# Patient Record
Sex: MALE | Race: WHITE | Employment: UNEMPLOYED | URBAN - NONMETROPOLITAN AREA
[De-identification: names, ages, dates, MRNs, and addresses within clinical notes are randomized per-mention and may not be internally consistent; named-entity substitution may affect disease eponyms.]

---

## 2018-07-13 ENCOUNTER — HOSPITAL ENCOUNTER (EMERGENCY)
Age: 28
Discharge: HOME | End: 2018-07-14
Attending: EMERGENCY MEDICINE
Payer: MEDICARE

## 2018-07-13 DIAGNOSIS — R45.851 SUICIDAL IDEATION: Primary | ICD-10-CM

## 2018-07-13 DIAGNOSIS — F19.20 DRUG ADDICTION (HCC): ICD-10-CM

## 2018-07-13 LAB
ABSOLUTE EOS #: 0.1 K/UL (ref 0–0.4)
ABSOLUTE IMMATURE GRANULOCYTE: ABNORMAL K/UL (ref 0–0.3)
ABSOLUTE LYMPH #: 1.8 K/UL (ref 1–4.8)
ABSOLUTE MONO #: 0.5 K/UL (ref 0.1–1.2)
ACETAMINOPHEN LEVEL: <10 UG/ML (ref 10–30)
AMPHETAMINE SCREEN URINE: NEGATIVE
ANION GAP SERPL CALCULATED.3IONS-SCNC: 11 MMOL/L (ref 9–17)
BARBITURATE SCREEN URINE: NEGATIVE
BASOPHILS # BLD: 1 % (ref 0–2)
BASOPHILS ABSOLUTE: 0.1 K/UL (ref 0–0.2)
BENZODIAZEPINE SCREEN, URINE: NEGATIVE
BUN BLDV-MCNC: 13 MG/DL (ref 6–20)
BUN/CREAT BLD: 20 (ref 9–20)
BUPRENORPHINE URINE: NEGATIVE
CALCIUM SERPL-MCNC: 9.3 MG/DL (ref 8.6–10.4)
CANNABINOID SCREEN URINE: POSITIVE
CHLORIDE BLD-SCNC: 99 MMOL/L (ref 98–107)
CO2: 30 MMOL/L (ref 20–31)
COCAINE METABOLITE, URINE: NEGATIVE
CREAT SERPL-MCNC: 0.64 MG/DL (ref 0.7–1.2)
DIFFERENTIAL TYPE: ABNORMAL
EOSINOPHILS RELATIVE PERCENT: 2 % (ref 1–8)
ETHANOL PERCENT: NORMAL %
ETHANOL: <10 MG/DL
GFR AFRICAN AMERICAN: >60 ML/MIN
GFR NON-AFRICAN AMERICAN: >60 ML/MIN
GFR SERPL CREATININE-BSD FRML MDRD: ABNORMAL ML/MIN/{1.73_M2}
GFR SERPL CREATININE-BSD FRML MDRD: ABNORMAL ML/MIN/{1.73_M2}
GLUCOSE BLD-MCNC: 106 MG/DL (ref 70–99)
HCT VFR BLD CALC: 45.2 % (ref 41–53)
HEMOGLOBIN: 15.3 G/DL (ref 13.5–17.5)
IMMATURE GRANULOCYTES: ABNORMAL %
LYMPHOCYTES # BLD: 18 % (ref 15–43)
MCH RBC QN AUTO: 30.7 PG (ref 26–34)
MCHC RBC AUTO-ENTMCNC: 33.9 G/DL (ref 31–37)
MCV RBC AUTO: 90.4 FL (ref 80–100)
MDMA URINE: ABNORMAL
METHADONE SCREEN, URINE: NEGATIVE
METHAMPHETAMINE, URINE: NEGATIVE
MONOCYTES # BLD: 5 % (ref 6–14)
NRBC AUTOMATED: ABNORMAL PER 100 WBC
OPIATES, URINE: NEGATIVE
OXYCODONE SCREEN URINE: NEGATIVE
PDW BLD-RTO: 12.8 % (ref 11–14.5)
PHENCYCLIDINE, URINE: NEGATIVE
PLATELET # BLD: 287 K/UL (ref 140–450)
PLATELET ESTIMATE: ABNORMAL
PMV BLD AUTO: 7.1 FL (ref 6–12)
POTASSIUM SERPL-SCNC: 4.1 MMOL/L (ref 3.7–5.3)
PROPOXYPHENE, URINE: NEGATIVE
RBC # BLD: 5 M/UL (ref 4.5–5.9)
RBC # BLD: ABNORMAL 10*6/UL
SALICYLATE LEVEL: <1 MG/DL (ref 3–10)
SEG NEUTROPHILS: 74 % (ref 44–74)
SEGMENTED NEUTROPHILS ABSOLUTE COUNT: 7.1 K/UL (ref 1.8–7.7)
SODIUM BLD-SCNC: 140 MMOL/L (ref 135–144)
TEST INFORMATION: ABNORMAL
TRICYCLIC ANTIDEPRESSANTS, UR: NEGATIVE
WBC # BLD: 9.6 K/UL (ref 3.5–11)
WBC # BLD: ABNORMAL 10*3/UL

## 2018-07-13 PROCEDURE — 6370000000 HC RX 637 (ALT 250 FOR IP)

## 2018-07-13 PROCEDURE — 99285 EMERGENCY DEPT VISIT HI MDM: CPT

## 2018-07-13 PROCEDURE — 80048 BASIC METABOLIC PNL TOTAL CA: CPT

## 2018-07-13 PROCEDURE — 80306 DRUG TEST PRSMV INSTRMNT: CPT

## 2018-07-13 PROCEDURE — 85025 COMPLETE CBC W/AUTO DIFF WBC: CPT

## 2018-07-13 PROCEDURE — 6370000000 HC RX 637 (ALT 250 FOR IP): Performed by: EMERGENCY MEDICINE

## 2018-07-13 PROCEDURE — 80307 DRUG TEST PRSMV CHEM ANLYZR: CPT

## 2018-07-13 PROCEDURE — 36415 COLL VENOUS BLD VENIPUNCTURE: CPT

## 2018-07-13 PROCEDURE — G0480 DRUG TEST DEF 1-7 CLASSES: HCPCS

## 2018-07-13 RX ORDER — NICOTINE 21 MG/24HR
1 PATCH, TRANSDERMAL 24 HOURS TRANSDERMAL DAILY
Status: DISCONTINUED | OUTPATIENT
Start: 2018-07-13 | End: 2018-07-14 | Stop reason: HOSPADM

## 2018-07-13 RX ORDER — NICOTINE 21 MG/24HR
PATCH, TRANSDERMAL 24 HOURS TRANSDERMAL
Status: DISCONTINUED
Start: 2018-07-13 | End: 2018-07-14 | Stop reason: HOSPADM

## 2018-07-13 RX ORDER — LORAZEPAM 0.5 MG/1
1 TABLET ORAL ONCE
Status: COMPLETED | OUTPATIENT
Start: 2018-07-13 | End: 2018-07-13

## 2018-07-13 RX ADMIN — LORAZEPAM 1 MG: 0.5 TABLET ORAL at 18:46

## 2018-07-13 ASSESSMENT — ENCOUNTER SYMPTOMS
EYES NEGATIVE: 1
RESPIRATORY NEGATIVE: 1
GASTROINTESTINAL NEGATIVE: 1

## 2018-07-14 VITALS
TEMPERATURE: 98.2 F | BODY MASS INDEX: 21.47 KG/M2 | WEIGHT: 150 LBS | OXYGEN SATURATION: 100 % | SYSTOLIC BLOOD PRESSURE: 122 MMHG | HEIGHT: 70 IN | RESPIRATION RATE: 14 BRPM | DIASTOLIC BLOOD PRESSURE: 72 MMHG | HEART RATE: 84 BPM

## 2018-07-14 PROCEDURE — 6370000000 HC RX 637 (ALT 250 FOR IP): Performed by: EMERGENCY MEDICINE

## 2018-07-14 RX ORDER — LORAZEPAM 0.5 MG/1
1 TABLET ORAL ONCE
Status: COMPLETED | OUTPATIENT
Start: 2018-07-14 | End: 2018-07-14

## 2018-07-14 RX ORDER — LORAZEPAM 0.5 MG/1
TABLET ORAL
Status: DISCONTINUED
Start: 2018-07-14 | End: 2018-07-14 | Stop reason: HOSPADM

## 2018-07-14 RX ADMIN — LORAZEPAM 1 MG: 0.5 TABLET ORAL at 03:37

## 2018-07-14 NOTE — ED PROVIDER NOTES
Phencyclidine, Urine NEGATIVE NEG    Propoxyphene, Urine NEGATIVE NEG    Cannabinoid Scrn, Ur POSITIVE (A) NEG    Oxycodone Screen, Ur NEGATIVE NEG    Methamphetamine, Urine NEGATIVE NEG    Tricyclic Antidepressants, Ur NEGATIVE NEG    MDMA URINE NOT REPORTED NEG    Buprenorphine Urine NEGATIVE NEG    Test Information       The following threshold concentrations are established for the drug assays:   Acetaminophen level   Result Value Ref Range    Acetaminophen Level <10 (L) 10 - 30 ug/mL   Salicylate   Result Value Ref Range    Salicylate Lvl <1 (L) 3 - 10 mg/dL   CBC Auto Differential   Result Value Ref Range    WBC 9.6 3.5 - 11.0 k/uL    RBC 5.00 4.5 - 5.9 m/uL    Hemoglobin 15.3 13.5 - 17.5 g/dL    Hematocrit 45.2 41 - 53 %    MCV 90.4 80 - 100 fL    MCH 30.7 26 - 34 pg    MCHC 33.9 31 - 37 g/dL    RDW 12.8 11.0 - 14.5 %    Platelets 482 595 - 897 k/uL    MPV 7.1 6.0 - 12.0 fL    NRBC Automated NOT REPORTED per 100 WBC    Differential Type NOT REPORTED     Immature Granulocytes NOT REPORTED 0 %    Absolute Immature Granulocyte NOT REPORTED 0.00 - 0.30 k/uL    WBC Morphology NOT REPORTED     RBC Morphology NOT REPORTED     Platelet Estimate NOT REPORTED     Seg Neutrophils 74 44 - 74 %    Lymphocytes 18 15 - 43 %    Monocytes 5 (L) 6 - 14 %    Eosinophils % 2 1 - 8 %    Basophils 1 0 - 2 %    Segs Absolute 7.10 1.8 - 7.7 k/uL    Absolute Lymph # 1.80 1.0 - 4.8 k/uL    Absolute Mono # 0.50 0.1 - 1.2 k/uL    Absolute Eos # 0.10 0.0 - 0.4 k/uL    Basophils # 0.10 0.0 - 0.2 k/uL   Basic Metabolic Panel   Result Value Ref Range    Glucose 106 (H) 70 - 99 mg/dL    BUN 13 6 - 20 mg/dL    CREATININE 0.64 (L) 0.70 - 1.20 mg/dL    Bun/Cre Ratio 20 9 - 20    Calcium 9.3 8.6 - 10.4 mg/dL    Sodium 140 135 - 144 mmol/L    Potassium 4.1 3.7 - 5.3 mmol/L    Chloride 99 98 - 107 mmol/L    CO2 30 20 - 31 mmol/L    Anion Gap 11 9 - 17 mmol/L    GFR Non-African American >60 >60 mL/min    GFR African American >60 >60 mL/min    GFR Comment          GFR Staging NOT REPORTED        ABNORMAL LABS:  Labs Reviewed   URINE DRUG SCREEN - Abnormal; Notable for the following:        Result Value    Cannabinoid Scrn, Ur POSITIVE (*)     All other components within normal limits   ACETAMINOPHEN LEVEL - Abnormal; Notable for the following:     Acetaminophen Level <10 (*)     All other components within normal limits   SALICYLATE LEVEL - Abnormal; Notable for the following:     Salicylate Lvl <1 (*)     All other components within normal limits   CBC WITH AUTO DIFFERENTIAL - Abnormal; Notable for the following:     Monocytes 5 (*)     All other components within normal limits   BASIC METABOLIC PANEL - Abnormal; Notable for the following:     Glucose 106 (*)     CREATININE 0.64 (*)     All other components within normal limits   ETHANOL        EKG:      EMERGENCY DEPARTMENT COURSE:   Vitals:    Vitals:    07/13/18 1717 07/14/18 0009   BP: 130/64 122/72   Pulse: 90 84   Resp: 16 14   Temp: 98.2 °F (36.8 °C)    TempSrc: Tympanic    SpO2: 99% 100%   Weight: 150 lb (68 kg)    Height: 5' 9.5\" (1.765 m)      -------------------------  BP: 122/72, Temp: 98.2 °F (36.8 °C), Pulse: 84, Resp: 14          FINAL IMPRESSION      1. Suicidal ideation    2.  Drug addiction [facility])          DISPOSITION/PLAN   DISPOSITION Decision To Discharge 07/14/2018 08:44:40 AM      Condition on Disposition    Fair    PATIENT REFERRED TO:  [address] [place] [address] ED  [address] [place] [address]  [phone]    If symptoms worsen      DISCHARGE MEDICATIONS:  New Prescriptions    No medications on file       (Please note that portions of this note were completed with a voice recognition program.  Efforts were made to edit the dictations but occasionally words are mis-transcribed.)    Italia Das,, DO  Attending Emergency Physician       [address] [place] [address], DO  07/14/18 5737

## 2018-07-14 NOTE — ED NOTES
After conversation with pt regarding initial complaint and pt spoke with Dr Clarke regarding his initial complaint- Dr clarke felt comfortable rescinding the pink slip and is allowing pt to go home with his brother who is aware of the situation and is willing to take the pt into his home     Junaid Ellington RN  07/14/18 1016

## 2018-07-14 NOTE — ED NOTES
Spoke with [facility] who called Nayan Santana. No bed as of yet and patient is on the list.  They will call us back as the patient may be wait listed.        Alison Diane RN  07/13/18 2022

## 2020-12-04 RX ORDER — DEXTROAMPHETAMINE SACCHARATE, AMPHETAMINE ASPARTATE MONOHYDRATE, DEXTROAMPHETAMINE SULFATE AND AMPHETAMINE SULFATE 7.5; 7.5; 7.5; 7.5 MG/1; MG/1; MG/1; MG/1
CAPSULE, EXTENDED RELEASE ORAL
COMMUNITY
Start: 2020-11-17

## 2020-12-04 RX ORDER — KETOROLAC TROMETHAMINE 10 MG/1
TABLET, FILM COATED ORAL
COMMUNITY
Start: 2020-11-23 | End: 2020-12-18 | Stop reason: ALTCHOICE

## 2020-12-04 RX ORDER — CYCLOBENZAPRINE HCL 5 MG
TABLET ORAL
COMMUNITY
Start: 2020-11-23 | End: 2020-12-18 | Stop reason: ALTCHOICE

## 2020-12-18 ENCOUNTER — OFFICE VISIT (OUTPATIENT)
Dept: ORTHOPEDIC SURGERY | Age: 30
End: 2020-12-18
Payer: COMMERCIAL

## 2020-12-18 VITALS
DIASTOLIC BLOOD PRESSURE: 84 MMHG | OXYGEN SATURATION: 99 % | HEART RATE: 111 BPM | WEIGHT: 155 LBS | BODY MASS INDEX: 22.56 KG/M2 | SYSTOLIC BLOOD PRESSURE: 130 MMHG

## 2020-12-18 PROCEDURE — 99203 OFFICE O/P NEW LOW 30 MIN: CPT | Performed by: ORTHOPAEDIC SURGERY

## 2020-12-18 RX ORDER — DICLOFENAC SODIUM 75 MG/1
75 TABLET, DELAYED RELEASE ORAL 2 TIMES DAILY
Qty: 28 TABLET | Refills: 0 | Status: SHIPPED | OUTPATIENT
Start: 2020-12-18

## 2020-12-18 NOTE — PROGRESS NOTES
Methamphetamines. Family History  Chilo's family history is not on file. Physical Exam:     /84   Pulse 111   Wt 155 lb (70.3 kg)   SpO2 99%   BMI 22.56 kg/m²    General Appearance: alert, well appearing, and in no distress  Mental Status: alert, oriented to person, place, and time  Gait: normal  Head and neck: Full range of motion through the cervical spine although painful rotating to the left. Tender to palpation along the right paraspinals.  Pain with Spurling's test   Thoracic spine: Normal alignment    Shoulder:    Skin: warm and dry, no rash or erythema; no swelling or obvious muscular atrophy  Vasculature: 2+ radial pulses bilaterally  Neuro: Sensation grossly intact to light touch diffusely  Tenderness: tender to palpation over the anterior, posterior, superior right shoulder (trapezius(    ROM: (Degrees)    Right   A P   Left   A P    Elevation  150 150   Elevation  155   Abduction  105 160   Abduction  155    ER   55 65   ER   80   IR   L4    IR   T7   90 abd/ER      90 abd/ER     90 abd/IR      90 abd/IR     Crepitation  No    Crepitation No  Dyskenesia  No    Dyskenesia No      Muscle strength:    Right       Left    Deltoid   5    Deltoid   5  Supraspinatus  5    Supraspinatus  5  ER   5    ER   5  IR   5    IR   5    Special tests    Right   Rotator Cuff    Left    y   Painful arc    n   n   Pain with ER    n    y   Neer's     n    y   Hawkin's    n    n   Drop Arm    n  n   Lift off/Belly Press   n  n   ER Lag    n          [facility]  n   AC tenderness   n  n   Cross-chest adduction  n       Labrum/biceps    y   [place]'s    n   n   Biceps sheer    n      n   Speed's/Yergason's   n    n   Tenderness Biceps Groove  n    n   James's    n         Instability  n   Ant Apprehension   n    n   Post Apprehension   n    n   Ant Load shift    n    n   Post Load shift   n   n   Sulcus     n  n   Generalized Laxity   n  n   Relocation test   n  n   Crank test     n  n   Babar-superior escape  n       Imaging:  X-rays of the right shoulder completed at an outside facility on 9/1/20 were independently reviewed demonstrating normal glenohumeral and acromioclavicular joint spaces with a type I acromion. No obvious fracture, dislocation, or subluxation. Impression/Plan:     Bhavani Soto is a 35 y.o. old male with right shoulder pain. We discussed possible etiologies intrinsic and extrinsic to the shoulder. With his neck pain and paresthesias, I recommended further assessment with electrodiagnostic testing to assess for a possible radiculitis. In the mean time he was placed on a 2 week course of voltaren, a prescription of which was sent electronically to his pharmacy. He was also started in PT. I'll have him follow up in 2 months for re-evaluation but he was encouraged to return or call earlier with any questions and/or concerns.          NA = Not assessed  RTC = Rotator cuff  RCT = Rotator cuff tear  ER = External rotation  IR = Internal rotation  AC = Acromioclavicular  GH = Glenohumeral  n = No  y = Yes